# Patient Record
Sex: FEMALE | URBAN - METROPOLITAN AREA
[De-identification: names, ages, dates, MRNs, and addresses within clinical notes are randomized per-mention and may not be internally consistent; named-entity substitution may affect disease eponyms.]

---

## 2018-02-14 ENCOUNTER — IMPORTED ENCOUNTER (OUTPATIENT)
Dept: URBAN - METROPOLITAN AREA CLINIC 1 | Facility: CLINIC | Age: 23
End: 2018-02-14

## 2018-02-14 PROBLEM — H52.03: Noted: 2018-02-14

## 2018-02-14 PROCEDURE — S0621 ROUTINE OPHTHALMOLOGICAL EXA: HCPCS

## 2018-02-14 NOTE — PATIENT DISCUSSION
1.  Hyperopia: Rx was given for corrective spectacles if indicated. 2.  Return for an appointment in 1 year for 40. with Dr. Agueda Underwood.

## 2019-04-04 ENCOUNTER — IMPORTED ENCOUNTER (OUTPATIENT)
Dept: URBAN - METROPOLITAN AREA CLINIC 1 | Facility: CLINIC | Age: 24
End: 2019-04-04

## 2019-04-04 PROBLEM — H52.03: Noted: 2019-04-04

## 2019-04-04 PROCEDURE — S0621 ROUTINE OPHTHALMOLOGICAL EXA: HCPCS

## 2019-04-04 NOTE — PATIENT DISCUSSION
1.  Hyperopia OU -- Finalized Glasses MRx was given to patient today for corrective spectacles if indicatedReturn for an appointment in 1 YR for a 40 OU with Dr. Shital Hernández.

## 2022-03-18 PROBLEM — Z34.93 PREGNANT AND NOT YET DELIVERED IN THIRD TRIMESTER: Status: ACTIVE | Noted: 2021-11-15

## 2022-03-19 PROBLEM — Z34.90 PREGNANCY: Status: ACTIVE | Noted: 2021-11-16

## 2022-04-02 ASSESSMENT — VISUAL ACUITY
OD_CC: J1
OS_SC: 20/20
OS_CC: J1
OD_SC: 20/20
OD_CC: 20/25
OS_CC: 20/20

## 2022-04-02 ASSESSMENT — TONOMETRY
OS_IOP_MMHG: 14
OD_IOP_MMHG: 14
OD_IOP_MMHG: 15
OS_IOP_MMHG: 15